# Patient Record
Sex: FEMALE | Race: OTHER | Employment: UNEMPLOYED | URBAN - METROPOLITAN AREA
[De-identification: names, ages, dates, MRNs, and addresses within clinical notes are randomized per-mention and may not be internally consistent; named-entity substitution may affect disease eponyms.]

---
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
## 2025-03-20 ENCOUNTER — OFFICE VISIT (OUTPATIENT)
Dept: URGENT CARE | Facility: CLINIC | Age: 20
End: 2025-03-20

## 2025-03-20 VITALS
HEART RATE: 85 BPM | WEIGHT: 201 LBS | RESPIRATION RATE: 16 BRPM | DIASTOLIC BLOOD PRESSURE: 73 MMHG | OXYGEN SATURATION: 99 % | TEMPERATURE: 98.2 F | SYSTOLIC BLOOD PRESSURE: 108 MMHG

## 2025-03-20 DIAGNOSIS — L30.9 DERMATITIS: Primary | ICD-10-CM

## 2025-03-20 DIAGNOSIS — L08.9 SKIN INFECTION: ICD-10-CM

## 2025-03-20 PROCEDURE — 99203 OFFICE O/P NEW LOW 30 MIN: CPT | Performed by: PHYSICIAN ASSISTANT

## 2025-03-20 RX ORDER — MUPIROCIN 20 MG/G
OINTMENT TOPICAL
Qty: 30 G | Refills: 0 | Status: SHIPPED | OUTPATIENT
Start: 2025-03-20

## 2025-03-20 RX ORDER — CEPHALEXIN 500 MG/1
500 CAPSULE ORAL 2 TIMES DAILY
Qty: 14 CAPSULE | Refills: 0 | Status: SHIPPED | OUTPATIENT
Start: 2025-03-20 | End: 2025-03-27

## 2025-03-20 RX ORDER — CLOTRIMAZOLE AND BETAMETHASONE DIPROPIONATE 10; .64 MG/G; MG/G
1 CREAM TOPICAL 2 TIMES DAILY
Qty: 45 G | Refills: 0 | Status: SHIPPED | OUTPATIENT
Start: 2025-03-20 | End: 2025-04-17

## 2025-03-20 NOTE — PROGRESS NOTES
Subjective   Patient ID: Jaz Staples is a 19 y.o. female who presents for Rash.    HPI     Pt c/o a rash in bilateral groin folds, abdominal fold, and on the left arm that has been ongoing for 1 month. Also, she has a wound on the left side of the abdomen that has been ongoing for around the same amount of time. States that it was looking worse 4-5 days ago, but has been dressing with petroleum-impregnated gauze, non-stick bandages and tape and has improved somewhat since then. She has not been using anything OTC for her Sx, wanted to come speak with someone first. Denies fever, chills, red streaking, drainage, bleeding. No one else at home has the rash. No new environmental factors. Reports that the rash is itchy/burning at times.     Review of Systems   All other systems reviewed and are negative.      Objective   /73   Pulse 85   Temp 36.8 °C (98.2 °F)   Resp 16   Wt 91.2 kg (201 lb)   SpO2 99%     Physical Exam  Vitals reviewed.   Constitutional:       General: She is awake.      Appearance: Normal appearance. She is well-developed.   HENT:      Head: Normocephalic and atraumatic.   Cardiovascular:      Rate and Rhythm: Normal rate.   Pulmonary:      Effort: Pulmonary effort is normal.   Musculoskeletal:      Cervical back: Full passive range of motion without pain.      Right lower leg: No edema.      Left lower leg: No edema.   Skin:     General: Skin is warm and dry.      Findings: Rash (red, raised maculopapular rash with excoriations present in bilateral groin folds, under abdominal fold, and excoriations present on L upper arm) and wound (in the circled area of diagram, L abdomen; sloughing of superficial layer of skin, no active draiange/bleeding, mild redness, no lymphangitis) present. No lesion.          Neurological:      General: No focal deficit present.      Mental Status: She is alert and oriented to person, place, and time.      Cranial Nerves: No facial asymmetry.      Motor: Motor  function is intact.      Gait: Gait is intact.   Psychiatric:         Attention and Perception: Attention normal.         Mood and Affect: Mood and affect normal.         Assessment/Plan   Problem List Items Addressed This Visit    None  Visit Diagnoses         Codes    Dermatitis    -  Primary L30.9    Relevant Medications    clotrimazole-betamethasone (Lotrisone) cream    Skin infection     L08.9    Relevant Medications    mupirocin (Bactroban) 2 % ointment    cephalexin (Keflex) 500 mg capsule          Antibiotics (Keflex) and Mupirocin ointment were prescribed - please use as directed. Can leave the wound open to air. Make sure to continue cleaning daily with Dial Gold soap. Use a lightly abrasive cloth like a washcloth to clean off excess dry skin.   Lotrisone was prescribed for the itchy rash that you are experiencing. If this prescription runs out, you can use 1% hydrocortisone cream mixed with clotrimazole 1% over the counter and mix these as well. I would recommend using 2 weeks on, taking a 1 week break, and then re-starting if you are needing to use this more than 2 weeks.   Benadryl (diphenhydramine) was recommended to use for itching   Follow up with a PCP as soon as you are able to get health insurance. A business card was provided.   [employer] card was provided to help you with prescriptions today. You can check on the website or jerri and shop around to see who has the best deals on the prescriptions.     Red flag symptoms reviewed with patient and all questions answered. Patient or parent/guardian verbalized understanding and agreement with care plan as above. All in office testing reviewed with patient. If symptoms worsen or do not improve, patient is to follow up with PCP or report to the ER.

## 2025-03-20 NOTE — PATIENT INSTRUCTIONS
Antibiotics (Keflex) and Mupirocin ointment were prescribed - please use as directed. Can leave the wound open to air. Make sure to continue cleaning daily with Dial Gold soap. Use a lightly abrasive cloth like a washcloth to clean off excess dry skin.   Lotrisone was prescribed for the itchy rash that you are experiencing. If this prescription runs out, you can use 1% hydrocortisone cream mixed with clotrimazole 1% over the counter and mix these as well. I would recommend using 2 weeks on, taking a 1 week break, and then re-starting if you are needing to use this more than 2 weeks.   Benadryl (diphenhydramine) was recommended to use for itching   Follow up with a PCP as soon as you are able to get health insurance. A business card was provided.   [employer] card was provided to help you with prescriptions today. You can check on the website or jerri and shop around to see who has the best deals on the prescriptions.